# Patient Record
Sex: MALE
[De-identification: names, ages, dates, MRNs, and addresses within clinical notes are randomized per-mention and may not be internally consistent; named-entity substitution may affect disease eponyms.]

---

## 2023-10-10 ENCOUNTER — HOSPITAL ENCOUNTER (OUTPATIENT)
Facility: HOSPITAL | Age: 58
Discharge: HOME | End: 2023-10-13

## 2023-10-10 DIAGNOSIS — E78.2 MIXED HYPERLIPIDEMIA: ICD-10-CM

## 2023-10-10 DIAGNOSIS — E78.1 HYPERGLYCERIDEMIA: ICD-10-CM

## 2023-10-10 PROCEDURE — 75571 CT HRT W/O DYE W/CA TEST: CPT

## 2023-10-12 NOTE — CARDIO/PULMONARY
Reached patient at his given mobile number and shared his coronary artery calcium score of one with him. We discussed the meaning of this score and potential lifestyle measures to take to decrease his risk of further development of CAD. Patient has no further questions at this time.